# Patient Record
Sex: MALE | Race: WHITE | NOT HISPANIC OR LATINO | Employment: FULL TIME | URBAN - METROPOLITAN AREA
[De-identification: names, ages, dates, MRNs, and addresses within clinical notes are randomized per-mention and may not be internally consistent; named-entity substitution may affect disease eponyms.]

---

## 2022-10-11 ENCOUNTER — TELEPHONE (OUTPATIENT)
Dept: PSYCHIATRY | Facility: CLINIC | Age: 37
End: 2022-10-11

## 2022-10-11 DIAGNOSIS — F90.0 ATTENTION DEFICIT HYPERACTIVITY DISORDER (ADHD), PREDOMINANTLY INATTENTIVE TYPE: Primary | ICD-10-CM

## 2022-10-11 NOTE — TELEPHONE ENCOUNTER
Client calling as he will run out of methylphenidate 10mg  Per PDMP only 14 day supply was filled appt scheduled 10/28  Forwarding to Dr Arsenio Montez

## 2022-10-12 RX ORDER — METHYLPHENIDATE HYDROCHLORIDE 10 MG/1
TABLET ORAL
Qty: 60 TABLET | Refills: 0 | Status: SHIPPED | OUTPATIENT
Start: 2022-10-12 | End: 2022-10-28 | Stop reason: SDUPTHER

## 2022-10-12 NOTE — TELEPHONE ENCOUNTER
This note was not shared with the patient due to reasonable likelihood of causing patient harm     Regarding Pt Emmett Mancuso)  : 1985 - chart reviewed on Methlyphenidate ER 10mg; Methlyphenidate 10mg BID for ADHD  PDMP , received short supply of the latter  Sent refill to pharmacy on file  Ayden Kapadia was called and informed

## 2022-10-27 NOTE — PATIENT INSTRUCTIONS
Kolton Tena [id number]   Thanks for presenting to today's Appointment at [facility]  Lamotrigine was increased to 150mg    Otherwise Your medications were not changed

## 2022-10-27 NOTE — PSYCH
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   He also teaches biology  Reported for teachers October and March is a more stressful  Mood: depressed and a little angela  ROS:  DEPRESSION: yes, although " Fanny by not trying to not internalize things  Stated he has resilience   Sleeping 6- 9hrs , Denies changes in energy level "    ANGELA: yes, " Stated for  "the past couple of day [he's] been having high mood  " But not angela"  Rajinder Jorge L  Described h/o angela - as  " gambling, wanting sex , spending money  Happened his  freshamn year of college  Sometimes  lasted days and hrs,            maybe a wk  Also had  grandiose thoughts, described as " thoughts of best decision he could ever make  Mood somewhat excitement  Then was             hospitalized x 1-2  days, was   diagnoses with Bipolar II disorder  Medication compliance: Yes  - Rajinder Coats reported noticed benefits from Paxil and Lamotrigine  Regarding Methylphenidate, he take 1 tab ER in the morning with 1 tab IR,                                                  by noon another 1 tab IR     Medication s/e- slightly " loopy" - thinks is due to responsibility at his job      SI/HI- no      Chart reviewed  Psychiatric History  Inpatient Hospitalization x 1 ( while in college)  [facility] , more recent [facility] 2019 ( PTSD, [place])   Prior med trials: Depakote , Lithium          Mental Status Evaluation:  General Appearance:  Rajinder Coats Olga Keller is a 44 y o   male casually dressed, adequate hygiene and grooming, looks stated age   Behavior:  pleasant, cooperative, intermittent eye contact, restless, High energy    Speech:  Normal to slightly pressured;  WNLrhythm, volume, latency, amount   Mood:  depressed and a little angela   Affect:  increased in intensity and mood-incongruent, excited , confidence    Thought Process:  logical and tangential   Thought Content:  normal   Perceptual Disturbances: no auditory hallucinations, no visual hallucinations, Does not appear responding or preoccupied   Delusions  No   Risk Potential: Suicidal Ideations No  Homicidal Ideations No  Potential for Aggression No     Sensorium:  Oriented to person, place, time/date and situation   Memory:  recent and remote memory grossly intact   Consciousness:  alert and awake   Attention: attention span and concentration are age appropriate   Insight:  fair   Judgment: fair   Gait/Station: normal   Motor Activity: no abnormal movements     There were no vitals filed for this visit  Medications:   Current Outpatient Medications on File Prior to Visit   Medication Sig Dispense Refill   •  lamoTRIgine (LaMICtal) 100 mg tablet  1 tablet by mouth per day     •  methylphenidate (METADATE ER) 10 MG ER tablet  1 tablet by mouth per morning     •  PARoxetine (PAXIL) 20 mg tablet  1 tablet by mouth per morning     •  methylphenidate (Ritalin) 10 mg tablet  1 tablet by mouth twice per day           Labs: I have personally reviewed all pertinent laboratory/tests results   Most Recent Labs: No results found for: WBC, RBC, HGB, HCT, PLT, RDW, NEUTROABS, SODIUM, K, CL, CO2, BUN, CREATININE, GLUC, GLUF, CALCIUM, AST, ALT, ALKPHOS, TP, ALB, TBILI, CHOLESTEROL, HDL, TRIG, LDLCALC, NONHDLC, VALPROICTOT, CARBAMAZEPIN, LITHIUM, AMMONIA, [id number], FREET4, T3FREE, PREGSERUM, HCG, HCGQUANT, RPR, HGBA1C, EAG     Vitals:    10/28/22 1444   BP: 133/98   Cuff Size: Adult   Pulse: 58   Weight: 84 4 kg (186 lb)   Height: 6' (1 829 m)     ________________________________________________________________________    A/P  Milly Simmons, 39 yr old  male, h/o hospitalization for depression and anxiety, h/o ADHD  Presented w/ complaints of inattention  History gathered: ASRS, medical records from prior provider  Prior trial of Ritalin ( 2016) , d/c Ativan  Currently tolerating Methylphenidate ER + IR ; increased productivity  Today reported history of bipolar disorder s/p hospitalization for angela, meeting criteria for symptoms unsure duration; history of MDD  Case complicated by MSE finding suggestive of acute angela - elated mood , high energy , confidence  Otherwise complaints of tolerance of Mehtlylphenidate, Lillkey due to low dose , otherwise that and antidepressant may have precipitated angela  DSM5  Otherwise specified  bipolar disorder ([address] )  Attention deficit hyperactivity disorder (ADHD), unspecified ADHD type  Unspecified Anxiety Disorder- resolved  Rule out acute angela      PLANS:  Discussed clinical finding and diagnoses of bipolar symptoms  Discussed possible antipsychotic or increased of Lamotrigine   Increased Lamotrigine to 150mg  Continue  Other medications as prescribed  Paxil 20mg  Methylphenidate ER 10mg  Methylphenidate IR 10 BID  PDMP w/o discrepancies-   Vitals are stable         Risks, benefits, and possible side effects of medications explained to patient and patient verbalizes understanding  Next Appointment:  2 months      [facility]  Face To Face:  2:36 PM -3:11PM;Documentation Time:  6:56 PM- 7:42 PM    Encounter Duration: Time Spent 35 minutes with Patient  Greater than 50% of total time was spent with the patient

## 2022-10-28 ENCOUNTER — OFFICE VISIT (OUTPATIENT)
Dept: PSYCHIATRY | Facility: CLINIC | Age: 37
End: 2022-10-28

## 2022-10-28 VITALS
HEART RATE: 58 BPM | WEIGHT: 186 LBS | BODY MASS INDEX: 25.19 KG/M2 | SYSTOLIC BLOOD PRESSURE: 133 MMHG | HEIGHT: 72 IN | DIASTOLIC BLOOD PRESSURE: 98 MMHG

## 2022-10-28 DIAGNOSIS — F90.0 ATTENTION DEFICIT HYPERACTIVITY DISORDER (ADHD), PREDOMINANTLY INATTENTIVE TYPE: ICD-10-CM

## 2022-10-28 DIAGNOSIS — F31.89 OTHER BIPOLAR DISORDER (HCC): Primary | ICD-10-CM

## 2022-10-28 DIAGNOSIS — F90.9 ATTENTION DEFICIT HYPERACTIVITY DISORDER (ADHD), UNSPECIFIED ADHD TYPE: ICD-10-CM

## 2022-10-28 RX ORDER — METHYLPHENIDATE HYDROCHLORIDE EXTENDED RELEASE 10 MG/1
TABLET ORAL
Qty: 30 TABLET | Refills: 0 | Status: SHIPPED | OUTPATIENT
Start: 2022-10-28

## 2022-10-28 RX ORDER — METHYLPHENIDATE HYDROCHLORIDE EXTENDED RELEASE 10 MG/1
10 TABLET ORAL EVERY MORNING
COMMUNITY
Start: 2022-09-27 | End: 2022-10-28 | Stop reason: SDUPTHER

## 2022-10-28 RX ORDER — METHYLPHENIDATE HYDROCHLORIDE 10 MG/1
TABLET ORAL
Qty: 60 TABLET | Refills: 0 | Status: SHIPPED | OUTPATIENT
Start: 2022-10-28

## 2022-10-28 RX ORDER — PAROXETINE HYDROCHLORIDE 20 MG/1
20 TABLET, FILM COATED ORAL EVERY EVENING
COMMUNITY
Start: 2022-10-18 | End: 2022-10-28 | Stop reason: SDUPTHER

## 2022-10-28 RX ORDER — LAMOTRIGINE 150 MG/1
TABLET ORAL
Qty: 30 TABLET | Refills: 1 | Status: SHIPPED | OUTPATIENT
Start: 2022-10-28

## 2022-10-28 RX ORDER — PAROXETINE HYDROCHLORIDE 20 MG/1
TABLET, FILM COATED ORAL
Qty: 30 TABLET | Refills: 1 | Status: SHIPPED | OUTPATIENT
Start: 2022-10-28

## 2022-10-28 RX ORDER — LAMOTRIGINE 100 MG/1
100 TABLET ORAL DAILY
COMMUNITY
Start: 2022-10-18 | End: 2022-10-28 | Stop reason: SDUPTHER

## 2022-11-25 ENCOUNTER — TELEPHONE (OUTPATIENT)
Dept: PSYCHIATRY | Facility: CLINIC | Age: 37
End: 2022-11-25

## 2022-11-28 DIAGNOSIS — F90.9 ATTENTION DEFICIT HYPERACTIVITY DISORDER (ADHD), UNSPECIFIED ADHD TYPE: ICD-10-CM

## 2022-11-28 RX ORDER — METHYLPHENIDATE HYDROCHLORIDE EXTENDED RELEASE 10 MG/1
TABLET ORAL
Qty: 30 TABLET | Refills: 0 | Status: SHIPPED | OUTPATIENT
Start: 2022-11-28

## 2022-11-28 NOTE — TELEPHONE ENCOUNTER
Medication Refill Request     Name of Medication Methylphenidate ER  Dose/Frequency 10mg qam  Quantity 30  Verified pharmacy   [x]  Verified ordering Provider   [x]  Does patient have enough for the next 3 days? Yes [] No [x]  Does patient have a follow-up appointment scheduled?  Yes [x] No []   If so when is appointment: 12/28/2022

## 2022-11-28 NOTE — TELEPHONE ENCOUNTER
Hunter CREWS 1985  Chart was reviewed    Metadate ER 10mg was sent pharmacy on file     This note was not shared with the patient due to reasonable likelihood of causing patient harm

## 2022-12-13 DIAGNOSIS — F90.0 ATTENTION DEFICIT HYPERACTIVITY DISORDER (ADHD), PREDOMINANTLY INATTENTIVE TYPE: ICD-10-CM

## 2022-12-13 RX ORDER — METHYLPHENIDATE HYDROCHLORIDE 10 MG/1
TABLET ORAL
Qty: 60 TABLET | Refills: 0 | Status: SHIPPED | OUTPATIENT
Start: 2022-12-13

## 2022-12-13 NOTE — TELEPHONE ENCOUNTER
Pt Nuzhat CREWS 1985  Chart was reviewed , PDMP without discrepancies     Methylphenidate 10mg BID was sent to [facility]           Current Outpatient Medications Prescribed   •  methylphenidate (Ritalin) 10 mg tablet, Methylphenidate  10m tablet po twice daily, Disp: 60 tablet, Rfl: 0    This note was not shared with the patient due to reasonable likelihood of causing patient harm

## 2022-12-13 NOTE — TELEPHONE ENCOUNTER
Medication Refill Request     Name of Medication methylphenidate  Dose/Frequency 10mg tablets bid  Quantity 60  Verified pharmacy   [x]  Verified ordering Provider   [x]  Does patient have enough for the next 3 days? Yes [] No [x]  Does patient have a follow-up appointment scheduled?  Yes [x] No []   If so when is appointment: 12/28/2022    Last fill per PDMP: 11/10/2022
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
## 2022-12-25 NOTE — PATIENT INSTRUCTIONS
José Art [id number]   Thanks for presenting to today's Appointment at [address] [place] [address] Methylphenidate extended release was increased to 20mg in the morning + 15IR in afternoon    Diet is also important to monitor - avoiding foods that lower effectiveness ( such as vit C , cranberry, etc)

## 2022-12-28 ENCOUNTER — OFFICE VISIT (OUTPATIENT)
Dept: PSYCHIATRY | Facility: CLINIC | Age: 37
End: 2022-12-28

## 2022-12-28 VITALS
BODY MASS INDEX: 25.2 KG/M2 | SYSTOLIC BLOOD PRESSURE: 138 MMHG | DIASTOLIC BLOOD PRESSURE: 87 MMHG | HEIGHT: 71 IN | HEART RATE: 64 BPM | WEIGHT: 180 LBS

## 2022-12-28 DIAGNOSIS — F31.89 OTHER BIPOLAR DISORDER (HCC): ICD-10-CM

## 2022-12-28 DIAGNOSIS — F90.9 ATTENTION DEFICIT HYPERACTIVITY DISORDER (ADHD), UNSPECIFIED ADHD TYPE: ICD-10-CM

## 2022-12-28 RX ORDER — METHYLPHENIDATE HYDROCHLORIDE 5 MG/1
TABLET ORAL
Qty: 90 TABLET | Refills: 0 | Status: SHIPPED | OUTPATIENT
Start: 2022-12-28

## 2022-12-28 RX ORDER — LAMOTRIGINE 150 MG/1
TABLET ORAL
Qty: 30 TABLET | Refills: 1 | Status: SHIPPED | OUTPATIENT
Start: 2022-12-28

## 2022-12-28 RX ORDER — METHYLPHENIDATE HYDROCHLORIDE EXTENDED RELEASE 20 MG/1
TABLET ORAL
Qty: 30 TABLET | Refills: 0 | Status: SHIPPED | OUTPATIENT
Start: 2022-12-28

## 2023-01-23 DIAGNOSIS — F90.9 ATTENTION DEFICIT HYPERACTIVITY DISORDER (ADHD), UNSPECIFIED ADHD TYPE: ICD-10-CM

## 2023-01-23 DIAGNOSIS — F31.89 OTHER BIPOLAR DISORDER (HCC): ICD-10-CM

## 2023-01-23 DIAGNOSIS — F90.0 ATTENTION DEFICIT HYPERACTIVITY DISORDER (ADHD), PREDOMINANTLY INATTENTIVE TYPE: ICD-10-CM

## 2023-01-23 RX ORDER — PAROXETINE HYDROCHLORIDE 20 MG/1
TABLET, FILM COATED ORAL
Qty: 30 TABLET | Refills: 2 | Status: SHIPPED | OUTPATIENT
Start: 2023-01-23

## 2023-01-23 RX ORDER — METHYLPHENIDATE HYDROCHLORIDE 5 MG/1
TABLET ORAL
Qty: 90 TABLET | Refills: 0 | Status: SHIPPED | OUTPATIENT
Start: 2023-01-23

## 2023-01-23 NOTE — TELEPHONE ENCOUNTER
Hunter Davis 1985 chart at [facility] was reviewed  H/o ADHD , Bipolar disorder  PDMP w/o discrepancies    Sent Ritalin and Paxil to [facility]          Current Medications Prescribed During This Encounter:   •  methylphenidate (Ritalin) 5 mg tablet, Methylphenidate 5m-3 tablets po in the afternoon (1-2pm), Disp: 90 tablet, Rfl: 0  •  PARoxetine (PAXIL) 20 mg tablet, Paroxetine 20m tablet daily, Disp: 30 tablet, Rfl: 2        Medications Discontinued During This Encounter   Medication Reason   • methylphenidate (Ritalin) 10 mg tablet         This note was not shared with the patient due to reasonable likelihood of causing patient harm

## 2023-01-23 NOTE — TELEPHONE ENCOUNTER
Medication Refill Request     Name of Medication Ritalin   Dose/Frequency 5 mg 2-3 daily   Quantity 30   Verified pharmacy   [x]  Verified ordering Provider   [x]  Does patient have enough for the next 3 days? Yes [x] No []  Does patient have a follow-up appointment scheduled? Yes [x] No []   If so when is appointment: 02/8/23    Medication Refill Request     Name of Medication Paxil   Dose/Frequency 20 mg   Quantity 90  Verified pharmacy   [x]  Verified ordering Provider   [x]  Does patient have enough for the next 3 days? Yes [x] No []  Does patient have a follow-up appointment scheduled?  Yes [x] No []   If so when is appointment: 02/8/23

## 2023-02-06 ENCOUNTER — TELEPHONE (OUTPATIENT)
Dept: PSYCHIATRY | Facility: CLINIC | Age: 38
End: 2023-02-06

## 2023-02-06 DIAGNOSIS — F90.9 ATTENTION DEFICIT HYPERACTIVITY DISORDER (ADHD), UNSPECIFIED ADHD TYPE: ICD-10-CM

## 2023-02-06 RX ORDER — METHYLPHENIDATE HYDROCHLORIDE EXTENDED RELEASE 20 MG/1
TABLET ORAL
Qty: 30 TABLET | Refills: 0 | Status: SHIPPED | OUTPATIENT
Start: 2023-02-06

## 2023-02-06 NOTE — PSYCH
[facility]/Hospital:  [address]   [facility]    Psychiatric Progress Note  MRN#: [id number]  Kemi Melgar 44 y o  male    This note was not shared with the patient due to reasonable likelihood of causing patient harm   This Patient was seen in the office today at [place] [address] location  __________________________________________________________________________________________________________________________________  OFFICE APPOINTMENT   Patient was seen today at [place] [address] location                                                Patient Kemi Melgar ,1985   Prescriber/Physician: Joelle Jennings DO Physician Location:   [address]   • Patient  is currently located in the [place], where I am  licensed  ___________________________________________________________________________________________________________________________________     Subjective:  Nimisha Toro reported griveing the death of his mother Jayesh Cotton)  After recent appointment he reported going down to [facility] and discovery she as not living great, inadequate living quarters, that he had to clean  Ruminate on mom's double life- addict  Reported on sadness, was tearful , although he equated that to grieft    Sleep , appetite - now stable x 3wk, 1 wk was intermittent   Also inability to shift focus, now he's returning to typical routine, reading more, retention information  Has supports regarding his wifeCarringcarol Wasserman), friends, also co-workers  Otherwise, he's compliant to Methylphenidate ER 20mg + IR 5mg x 3 daily  Prefers ER > IM due to less rapid stop of the drug  Reported increased focus and more calm      Mental Status Evaluation:  General Appearance:  Kemi Melgar is a 44 y o    male casually dressed, adequate hygiene and grooming, looks stated age   Behavior:  pleasant, cooperative, intermittent eye contact, restlessness,    Speech:  Normal to slightly pressured; WNL rhythm, volume, latency, amount   Mood:  depressed   Affect:  depression and tearful   Thought Process:  circumstantial and logical   Thought Content:  no overt delusions, normal, ruminations slightly    Perceptual Disturbances: no auditory hallucinations, no visual hallucinations, Does not appear responding or preoccupied   Delusions  No   Risk Potential: Suicidal Ideations No  Homicidal Ideations No  Potential for Aggression No     Sensorium:  Oriented to person, place, time/date and situation   Memory:  recent and remote memory grossly intact   Consciousness:  alert and awake   Attention: attention span and concentration are age appropriate   Insight:  fair   Judgment: fair   Gait/Station: normal   Motor Activity: no abnormal movements     There were no vitals filed for this visit  Medications:   Current Outpatient Medications on File Prior to Visit   Medication Sig Dispense Refill   •  lamoTRIgine (LaMICtal) 100 mg tablet  1 tablet by mouth per day     •  methylphenidate (METADATE ER) 10 MG ER tablet  1 tablet by mouth per morning     •  PARoxetine (PAXIL) 20 mg tablet  1 tablet by mouth per morning     •  methylphenidate (Ritalin) 10 mg tablet  1 tablet by mouth twice per day           Labs: I have personally reviewed all pertinent laboratory/tests results   Most Recent Labs: No results found for: WBC, RBC, HGB, HCT, PLT, RDW, NEUTROABS, SODIUM, K, CL, CO2, BUN, CREATININE, GLUC, GLUF, CALCIUM, AST, ALT, ALKPHOS, TP, ALB, TBILI, CHOLESTEROL, HDL, TRIG, LDLCALC, NONHDLC, VALPROICTOT, CARBAMAZEPIN, LITHIUM, AMMONIA, [id number], FREET4, T3FREE, PREGSERUM, HCG, HCGQUANT, RPR, HGBA1C, EAG     There were no vitals filed for this visit   ________________________________________________________________________    A/P  Bridgette Maloney, 44 y o ,  male, h/o hospitalization for depression and anxiety, h/o ADHD, presented regarding inattention w/ history and clinical finding of symptoms  Case complicated, h/o bipolar symptoms, recent hypomania finding  Currently MSE normative sadness cause via death of mother  Otherwise,  Murtis Morning has adequate response to higher dose of Methylphenidate ER and IR PRN  DSM 5  Attention deficit hyperactivity disorder (ADHD), unspecified ADHD type  Bipolar disorder , otherwise specified        PLANS:  History/external records were reviewed  Discussed diagnotic impression/clinical findings regarding grieve  Discussed stage of grief  PDMP w/o discrepancies- 23, 23  Vitals stable   Methylphenidate ER 20mg  Methylphenidate IR 5 mg x 3  Lamotrigine 150mg  Paxil 20mg    Medications Prescribed During This Encounter at [facility]:  -     lamoTRIgine (LaMICtal) 150 MG tablet; Lamotrigine 150m tablet po  Daily      Treatement: Risks, benefits, and possible side effects of medications explained to patient and patient verbalizes understanding  Next Appointment at [facility]: 5-8wks    Today's Appointment@ [facility]  Face To Face:  5:13 PM - 5:40PM    Documentation:   10:02PM to 10:07PM                                  Encounter Duration: Time Spent 27 minutes with Patient  Greater than 50% of total time was spent with the patient         MDM  Number of Diagnoses or Management Options  Attention deficit hyperactivity disorder (ADHD), unspecified ADHD type: established, improving  Otherwise specified bipolar disorder ([facility] [address] ): established, improving     Amount and/or Complexity of Data Reviewed  Review and summarize past medical records: yes    Risk of Complications, Morbidity, and/or Mortality  Presenting problems: moderate  Management options: moderate

## 2023-02-06 NOTE — PATIENT INSTRUCTIONS
Roby Baldwin [id number]   Thanks for presenting to today's Appointment at [facility]    Your medications were not changed

## 2023-02-06 NOTE — TELEPHONE ENCOUNTER
Patient requests enough to take atleast take him to his appt on Wednesday  Medication Refill Request     Name of Medication Metadate ER  Dose/Frequency 20mg 1 tab po per morning   Quantity 30 tabs  Verified pharmacy   [x]  Verified ordering Provider   [x]  Does patient have enough for the next 3 days? Yes [] No [x]  Does patient have a follow-up appointment scheduled?  Yes [x] No []   If so when is appointment:2/8/23 @5:00

## 2023-02-06 NOTE — TELEPHONE ENCOUNTER
Pt José Art 1985 chart as [facility] was reviewed ,h/o ADHD   PDMP w/o discrepancies  Methylphenidate ER 20mg was sent to [facility]        This note was not shared with the patient due to reasonable likelihood of causing patient harm

## 2023-02-08 ENCOUNTER — OFFICE VISIT (OUTPATIENT)
Dept: PSYCHIATRY | Facility: CLINIC | Age: 38
End: 2023-02-08

## 2023-02-08 VITALS
HEART RATE: 64 BPM | SYSTOLIC BLOOD PRESSURE: 143 MMHG | BODY MASS INDEX: 25.2 KG/M2 | DIASTOLIC BLOOD PRESSURE: 91 MMHG | HEIGHT: 71 IN | WEIGHT: 180 LBS

## 2023-02-08 DIAGNOSIS — F90.9 ATTENTION DEFICIT HYPERACTIVITY DISORDER (ADHD), UNSPECIFIED ADHD TYPE: Primary | ICD-10-CM

## 2023-02-08 DIAGNOSIS — F31.89 OTHER BIPOLAR DISORDER (HCC): ICD-10-CM

## 2023-02-08 RX ORDER — LAMOTRIGINE 150 MG/1
TABLET ORAL
Qty: 30 TABLET | Refills: 2 | Status: SHIPPED | OUTPATIENT
Start: 2023-02-08

## 2023-02-21 DIAGNOSIS — F31.89 OTHER BIPOLAR DISORDER (HCC): ICD-10-CM

## 2023-02-21 DIAGNOSIS — F90.9 ATTENTION DEFICIT HYPERACTIVITY DISORDER (ADHD), UNSPECIFIED ADHD TYPE: ICD-10-CM

## 2023-02-21 RX ORDER — PAROXETINE HYDROCHLORIDE 20 MG/1
TABLET, FILM COATED ORAL
Qty: 30 TABLET | Refills: 2 | Status: SHIPPED | OUTPATIENT
Start: 2023-02-21

## 2023-02-21 RX ORDER — METHYLPHENIDATE HYDROCHLORIDE 5 MG/1
TABLET ORAL
Qty: 90 TABLET | Refills: 0 | Status: SHIPPED | OUTPATIENT
Start: 2023-02-21

## 2023-02-21 NOTE — TELEPHONE ENCOUNTER
Patient calling for RF of paxil and ritalin IR to hold over until appointment on 3/22/23  Last appointment 2//8/23 but additional script not provided at that time  Last fill per PDMP: 1/25/2023  Forwarding to Dr Aleshia Beckwith for approval as Dr Solomon Stephenson is unavailable

## 2023-03-06 DIAGNOSIS — F31.89 OTHER BIPOLAR DISORDER (HCC): ICD-10-CM

## 2023-03-06 DIAGNOSIS — F90.9 ATTENTION DEFICIT HYPERACTIVITY DISORDER (ADHD), UNSPECIFIED ADHD TYPE: ICD-10-CM

## 2023-03-06 NOTE — TELEPHONE ENCOUNTER
Srinivasan Nava  Needs RF for methylphenidate ER 20mg, Last filled per PDMP: 2/6/2023  Also needs Rf of lamotrigine   Forwarding to Dr Vanita Dobson for approval

## 2023-03-07 RX ORDER — METHYLPHENIDATE HYDROCHLORIDE EXTENDED RELEASE 20 MG/1
TABLET ORAL
Qty: 30 TABLET | Refills: 0 | Status: SHIPPED | OUTPATIENT
Start: 2023-03-07

## 2023-03-07 RX ORDER — LAMOTRIGINE 150 MG/1
TABLET ORAL
Qty: 30 TABLET | Refills: 0 | Status: SHIPPED | OUTPATIENT
Start: 2023-03-07

## 2023-03-07 NOTE — TELEPHONE ENCOUNTER
Client called, states the pharmacy has received approval on medication refills, however, they are awaiting a call from the office  Client is unsure why they are awaiting a call when we have sent approval  Please contact pharmacy for more information

## 2023-03-07 NOTE — TELEPHONE ENCOUNTER
Received a voicemail from client this morning informing us that today is the last day of supply for Metadate ER 20mg  Please fill at soonest convenience

## 2023-03-21 DIAGNOSIS — F90.9 ATTENTION DEFICIT HYPERACTIVITY DISORDER (ADHD), UNSPECIFIED ADHD TYPE: ICD-10-CM

## 2023-03-21 DIAGNOSIS — F31.89 OTHER BIPOLAR DISORDER (HCC): ICD-10-CM

## 2023-03-21 RX ORDER — METHYLPHENIDATE HYDROCHLORIDE 5 MG/1
TABLET ORAL
Qty: 90 TABLET | Refills: 0 | Status: CANCELLED | OUTPATIENT
Start: 2023-03-21

## 2023-03-21 RX ORDER — PAROXETINE HYDROCHLORIDE 20 MG/1
TABLET, FILM COATED ORAL
Qty: 30 TABLET | Refills: 2 | Status: CANCELLED | OUTPATIENT
Start: 2023-03-21

## 2023-03-21 NOTE — TELEPHONE ENCOUNTER
Pt called - he lvm on rx line yesterday,3/21/23, and was calling for status  Looked in Pt chart- looks like refills haven't been requested yet  Medication Refill Request     Name of Medication Paxil  Dose/Frequency 20 mg/ 1 tab daily  Quantity 30tabs  Verified pharmacy   [x]  Verified ordering Provider   [x]  Does patient have enough for the next 3 days? Yes [] No [x]  Does patient have a follow-up appointment scheduled? Yes [] No [x]   If so when is appointment: Prescriber on Emergency Family Leave  Pt is on Maintenance meds  Name of Medication Ritalin  Dose/Frequency 5 mg/ 2-3 tabs po in the afternoon (1-2pm)  Quantity 90tabs  Verified pharmacy   [x]  Verified ordering Provider   [x]  Does patient have enough for the next 3 days? Yes [] No [x]  Does patient have a follow-up appointment scheduled? Yes [] No [x]   If so when is appointment: Prescriber on Emergency Family Leave  Pt is on Maintenance meds

## 2023-03-22 DIAGNOSIS — F90.9 ATTENTION DEFICIT HYPERACTIVITY DISORDER (ADHD), UNSPECIFIED ADHD TYPE: ICD-10-CM

## 2023-03-22 DIAGNOSIS — F31.89 OTHER BIPOLAR DISORDER (HCC): ICD-10-CM

## 2023-03-22 RX ORDER — PAROXETINE HYDROCHLORIDE 20 MG/1
TABLET, FILM COATED ORAL
Qty: 30 TABLET | Refills: 1 | Status: SHIPPED | OUTPATIENT
Start: 2023-03-22

## 2023-03-22 RX ORDER — METHYLPHENIDATE HYDROCHLORIDE EXTENDED RELEASE 20 MG/1
TABLET ORAL
Qty: 30 TABLET | Refills: 0 | Status: SHIPPED | OUTPATIENT
Start: 2023-04-04

## 2023-03-22 RX ORDER — METHYLPHENIDATE HYDROCHLORIDE 5 MG/1
TABLET ORAL
Qty: 90 TABLET | Refills: 0 | Status: SHIPPED | OUTPATIENT
Start: 2023-03-22

## 2023-03-22 RX ORDER — LAMOTRIGINE 150 MG/1
TABLET ORAL
Qty: 30 TABLET | Refills: 1 | Status: SHIPPED | OUTPATIENT
Start: 2023-03-22

## 2023-03-22 NOTE — TELEPHONE ENCOUNTER
Patient calling for RF  [facility] Plan currently unavailable  Will forward RF request to available provider  Last fill of ritalin 5mg-2/22  Last fill of metadate ER - 3/9

## 2023-05-02 DIAGNOSIS — F90.9 ATTENTION DEFICIT HYPERACTIVITY DISORDER (ADHD), UNSPECIFIED ADHD TYPE: ICD-10-CM

## 2023-05-02 NOTE — TELEPHONE ENCOUNTER
Patient of Dr Amanda Conde, who is currently unavailable, calling for RF of methylphenidate ER 20mg  Last fill per PDMP: 4/6   Forwarding for approval

## 2023-05-03 RX ORDER — METHYLPHENIDATE HYDROCHLORIDE EXTENDED RELEASE 20 MG/1
TABLET ORAL
Qty: 30 TABLET | Refills: 0 | Status: SHIPPED | OUTPATIENT
Start: 2023-05-03

## 2023-05-16 DIAGNOSIS — F90.9 ATTENTION DEFICIT HYPERACTIVITY DISORDER (ADHD), UNSPECIFIED ADHD TYPE: ICD-10-CM

## 2023-05-17 RX ORDER — METHYLPHENIDATE HYDROCHLORIDE 5 MG/1
TABLET ORAL
Qty: 90 TABLET | Refills: 0 | Status: SHIPPED | OUTPATIENT
Start: 2023-05-17

## 2023-05-30 DIAGNOSIS — F31.89 OTHER BIPOLAR DISORDER (HCC): ICD-10-CM

## 2023-05-30 DIAGNOSIS — F90.9 ATTENTION DEFICIT HYPERACTIVITY DISORDER (ADHD), UNSPECIFIED ADHD TYPE: ICD-10-CM

## 2023-05-30 NOTE — TELEPHONE ENCOUNTER
Patient of Dr Sabine Riggs, who is currently unavailable, calling for RF of metadate 20mg and lamotrigine  Last fill per PDMP: 5/4   Forwarding for approval

## 2023-05-31 RX ORDER — LAMOTRIGINE 150 MG/1
TABLET ORAL
Qty: 30 TABLET | Refills: 1 | Status: SHIPPED | OUTPATIENT
Start: 2023-05-31

## 2023-05-31 RX ORDER — METHYLPHENIDATE HYDROCHLORIDE EXTENDED RELEASE 20 MG/1
TABLET ORAL
Qty: 30 TABLET | Refills: 0 | Status: SHIPPED | OUTPATIENT
Start: 2023-06-01

## 2023-05-31 NOTE — TELEPHONE ENCOUNTER
Called patient and confirmed that he has been compliant with meds, including Lamotrigine (Lamictal) and he has enough to last till Friday  Discussed risks of being non compliant with Lamotrigine (Lamictal) including Kathya Ekaterina Syndrome

## 2023-06-06 ENCOUNTER — TELEPHONE (OUTPATIENT)
Dept: PSYCHIATRY | Facility: CLINIC | Age: 38
End: 2023-06-06

## 2023-06-06 NOTE — TELEPHONE ENCOUNTER
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

## 2023-06-12 DIAGNOSIS — F90.9 ATTENTION DEFICIT HYPERACTIVITY DISORDER (ADHD), UNSPECIFIED ADHD TYPE: ICD-10-CM

## 2023-06-12 DIAGNOSIS — F31.89 OTHER BIPOLAR DISORDER (HCC): ICD-10-CM

## 2023-06-12 RX ORDER — PAROXETINE HYDROCHLORIDE 20 MG/1
TABLET, FILM COATED ORAL
Qty: 30 TABLET | Refills: 1 | Status: SHIPPED | OUTPATIENT
Start: 2023-06-12

## 2023-06-12 RX ORDER — METHYLPHENIDATE HYDROCHLORIDE 5 MG/1
TABLET ORAL
Qty: 90 TABLET | Refills: 0 | Status: SHIPPED | OUTPATIENT
Start: 2023-06-12

## 2023-06-12 NOTE — TELEPHONE ENCOUNTER
Patient of Dr Dilshad Coley, who is currently unavailable in need of RF for paxil and methylphenidate 5mg  Last fill per pDMP: 5/17

## 2023-06-28 DIAGNOSIS — F90.9 ATTENTION DEFICIT HYPERACTIVITY DISORDER (ADHD), UNSPECIFIED ADHD TYPE: ICD-10-CM

## 2023-06-28 DIAGNOSIS — F31.89 OTHER BIPOLAR DISORDER (HCC): ICD-10-CM

## 2023-06-28 RX ORDER — LAMOTRIGINE 150 MG/1
TABLET ORAL
Qty: 30 TABLET | Refills: 0 | Status: SHIPPED | OUTPATIENT
Start: 2023-06-28

## 2023-06-28 RX ORDER — METHYLPHENIDATE HYDROCHLORIDE EXTENDED RELEASE 20 MG/1
TABLET ORAL
Qty: 30 TABLET | Refills: 0 | Status: SHIPPED | OUTPATIENT
Start: 2023-06-28

## 2023-07-12 ENCOUNTER — OFFICE VISIT (OUTPATIENT)
Dept: PSYCHIATRY | Facility: CLINIC | Age: 38
End: 2023-07-12
Payer: COMMERCIAL

## 2023-07-12 DIAGNOSIS — F90.9 ATTENTION DEFICIT HYPERACTIVITY DISORDER (ADHD), UNSPECIFIED ADHD TYPE: ICD-10-CM

## 2023-07-12 DIAGNOSIS — F31.89 OTHER BIPOLAR DISORDER (HCC): ICD-10-CM

## 2023-07-12 PROCEDURE — 99213 OFFICE O/P EST LOW 20 MIN: CPT

## 2023-07-12 RX ORDER — PAROXETINE 30 MG/1
TABLET, FILM COATED ORAL
Qty: 30 TABLET | Refills: 1 | Status: SHIPPED | OUTPATIENT
Start: 2023-07-12

## 2023-07-12 RX ORDER — METHYLPHENIDATE HYDROCHLORIDE 5 MG/1
TABLET ORAL
Qty: 90 TABLET | Refills: 0 | Status: SHIPPED | OUTPATIENT
Start: 2023-07-12

## 2023-07-12 NOTE — PSYCH
[facility]/Hospital: [address] [facility], [address]    Psychiatric Progress Note  MRN#: [id number]  Bill Rivera 44 y.o. male    This note was not shared with the patient due to reasonable likelihood of causing patient harm   This Patient was seen in the office today at [address] [facility] location. __________________________________________________________________________________________________________________________________  OFFICE APPOINTMENT   Patient was seen today at [address] [facility] location                                                Patient Bill Rivera ,1985   Prescriber/Physician: BISI Perez Physician Location:   [address] [facility] [address]   • Patient  is currently located in the [place], where I am  licensed  ___________________________________________________________________________________________________________________________________     Subjective:  Patient of Dr. Villanueva, being treated for ADHD and bipolar 2 disorder. Patient is observed on Lamictal 150mg po daily, Paxil 20mg po daily, Methylphenidate ER 20mg po daily and Methylphenidate IR 5mg po TID. Patient has been compliant with his medications and denies any side effects. Patient states "things have been okay," since last appointment but has experience an increase in depressive symptoms. Patient continues to have grief about mother who passed away and was tearful at times. Patient reports sleep has improved but struggles to get up in the morning and takes frequent naps throughout the day. Appetite fluctuates with an increase in stress eating. Energy and motivation has been low, patient reports drinking more coffee and hitting the snooze button more often.  Patient reports he recently saw PCP and labs were WNL other than her thyroid levels, which he will follow up on. Patient reports he fractured his elbow in April 2023 and has gotten permission to swim, which he is happy about. Patient has a good support system including his wife, friends, coworkers and sees his counselor once a week. Patient denies elevated moods and SI/HI. Mental Status Evaluation:  General Appearance:  Dennis Partida is a 44 y.o.  male casually dressed, adequate hygiene and grooming, looks stated age   Behavior:  pleasant, cooperative, intermittent eye contact   Speech: WNL rhythm, volume, latency, amount   Mood:  depressed   Affect:  depression and tearful at times   Thought Process:  circumstantial and logical   Thought Content:  no overt delusions, normal, ruminations slightly    Perceptual Disturbances: no auditory hallucinations, no visual hallucinations, Does not appear responding or preoccupied   Delusions  No   Risk Potential: Suicidal Ideations No  Homicidal Ideations No  Potential for Aggression No     Sensorium:  Oriented to person, place, time/date and situation   Memory:  recent and remote memory grossly intact   Consciousness:  alert and awake   Attention: attention span and concentration are age appropriate   Insight:  fair   Judgment: fair   Gait/Station: normal   Motor Activity: no abnormal movements     There were no vitals filed for this visit. Medications:   Current Outpatient Medications on File Prior to Visit   Medication Sig Dispense Refill   •  lamoTRIgine (LaMICtal) 100 mg tablet  1 tablet by mouth per day     •  methylphenidate (METADATE ER) 10 MG ER tablet  1 tablet by mouth per morning     •  PARoxetine (PAXIL) 20 mg tablet  1 tablet by mouth per morning     •  methylphenidate (Ritalin) 10 mg tablet  1 tablet by mouth twice per day           Labs: I have personally reviewed all pertinent laboratory/tests results.  Most Recent Labs: No results found for: "WBC", "RBC", "HGB", "HCT", "PLT", "RDW", "NEUTROABS", "SODIUM", "K", "CL", "CO2", "BUN", "CREATININE", "GLUC", "GLUF", "CALCIUM", "AST", "ALT", "ALKPHOS", "TP", "ALB", "TBILI", "CHOLESTEROL", "HDL", "TRIG", "LDLCALC", "[address]", "VALPROICTOT", "CARBAMAZEPIN", "LITHIUM", "AMMONIA", "[id number]", "Lindle Mew", "Marvia Likes", "PREGSERUM", "HCG", "HCGQUANT", "RPR", "HGBA1C", "EAG"     There were no vitals filed for this visit.  ________________________________________________________________________    A/P  Arin Gannonshant, 44 y.o.,  male, h/o hospitalization for depression and anxiety, h/o ADHD, presented regarding inattention w/ history and clinical finding of symptoms. Case complicated, h/o bipolar symptoms, recent hypomania finding. Currently MSE normative sadness cause via death of mother. Otherwise,  Baldemar Pepe has adequate response to higher dose of Methylphenidate ER and IR PRN. DSM 5  Attention deficit hyperactivity disorder (ADHD), unspecified ADHD type  Bipolar disorder , otherwise specified        PLANS:  PDMP w/o discrepancies- 06/14/23, 6/29/23  Continue Methylphenidate ER 20mg po QAM  Continue Methylphenidate IR 5mg po TID  Continue Lamotrigine 150mg po daily  Increase Paxil to 30mg po daily       Treatement: Risks, benefits, and possible side effects of medications explained to patient and patient verbalizes understanding. [facility] 1 month f/u with Dr. Keenan Edmond Appointment@ [facility]  Face To Face:  3:20 PM - 3:50PM                                  Encounter Duration: Time Spent 20 minutes with Patient. Greater than 50% of total time was spent with the patient

## 2023-07-25 DIAGNOSIS — F31.89 OTHER BIPOLAR DISORDER (HCC): ICD-10-CM

## 2023-07-25 DIAGNOSIS — F90.9 ATTENTION DEFICIT HYPERACTIVITY DISORDER (ADHD), UNSPECIFIED ADHD TYPE: ICD-10-CM

## 2023-07-25 RX ORDER — METHYLPHENIDATE HYDROCHLORIDE EXTENDED RELEASE 20 MG/1
TABLET ORAL
Qty: 30 TABLET | Refills: 0 | Status: SHIPPED | OUTPATIENT
Start: 2023-07-25

## 2023-07-25 RX ORDER — LAMOTRIGINE 150 MG/1
TABLET ORAL
Qty: 21 TABLET | Refills: 1 | Status: SHIPPED | OUTPATIENT
Start: 2023-07-25

## 2023-07-25 NOTE — TELEPHONE ENCOUNTER
Pt  Dennis Partida 1985 , SLP[facility] chart was reviewed. PDMP w/o discrepancies.      Lamotrigine, Methylphenidate was sent to [facility]        This note was not shared with the patient due to reasonable likelihood of causing patient harm

## 2023-08-09 DIAGNOSIS — F90.9 ATTENTION DEFICIT HYPERACTIVITY DISORDER (ADHD), UNSPECIFIED ADHD TYPE: ICD-10-CM

## 2023-08-09 RX ORDER — METHYLPHENIDATE HYDROCHLORIDE 5 MG/1
TABLET ORAL
Qty: 90 TABLET | Refills: 0 | Status: SHIPPED | OUTPATIENT
Start: 2023-08-09

## 2023-08-09 NOTE — TELEPHONE ENCOUNTER
Pt. La Nena Jacobo, 1985 , SLPF chart was reviewed.  PDMP w/o discrepancies    Ritalin 5mg , 90qty was sent to [facility] [facility]      This note was not shared with the patient due to reasonable likelihood of causing patient harm

## 2023-08-10 NOTE — PSYCH
[facility]/Hospital: [address] [facility], [address]    Psychiatric Progress Note  MRN#: [id number]  Shawn Enrique 44 y.o. male    This note was not shared with the patient due to reasonable likelihood of causing patient harm   _________________________________________________________________________________________________________________________________  OFFICE APPOINTMENT   Seen today at [address] [facility] location                                                Patient Shawn Enrique ,1985   Prescriber/Physician: Gabi Lewis DO Physician Location:   [address] [facility] [address]     • This service was provided in the office. Patient is currently located in the [place], where I am  licensed. • Patient gave consent to proceed with encounter; acknowledge understanding of security and privacy of encounter   • Patient identity was verified [facility]  • Patient verbalized understanding evaluation only involves Psychiatric diagnosing, prescribing, result monitoring   • Patient was informed this is a billable service  ___________________________________________________________________________________________________________________________________       Subjective:  Mayank stated mood as depressed; as there's more thought about his mom ( in Dec 22). He's noticed increase guilt about cutting communication with her due to her drinking, Ruminated about prior interventions that were not successful. He described fears of his dad not going through something similar. Mayank acknowledged crying more, difficulties getting up in the morning. 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  Otherwise, he loves his job, "just want to get out of this "funk." Complained also about panic attacks, this its due to Ritalin, h/o similar s/e when previously prescribed. Eleanor Alvarez not sure if he want's to remain on stimulants. He requested Bupropion trail. He recently started Paxil 30mg , thinks that's working although not w/Ritalin. Regarding herlinda- he denied recent symptoms- described once and awhile, once monthly low sleep, talkative ,not intense. He noticed that's worse after drinking      ROS:  Depression: defer to HPI; amotivation  Anxiety: defer to HPI  Appetite- he stress eats ( fast foods)  Irritability: denies  Sleep: "lag time falling to sleep, in general "Okay"  Hopelessness- denies   Herlinda- defer to HPI   SI/HI  : denies     Medication: Eleanor Alvarez is compliant to Ritalin, Methylphenidate, Paxil  Medication side effects: Paxil -he gained 4lb      Medical ROS:  Pertinent items are noted in HPI, all other symptoms are negative   Substance Hx:  Alcohol- 3 night a wk in the summer     FHX:  Alcoholism- mother, dad  Sister- Bipolar I disorder    Social Hx  Eleanor Alvarez works as a teacher , 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 Mental Status Evaluation:  General Appearance:  Eleanor Alvarez is a 44 y.o.  male casually dressed, adequate hygiene and grooming, looks stated age   Behavior:  pleasant, cooperative, intermittent eye contact,    Speech:  Normal to slightly pressured;   WNL rhythm, volume, latency, amount   Mood:  depressed, anxious   Affect:  depression and tearful   Thought Process:  circumstantial and logical   Thought Content:  no overt delusions, normal, ruminations slightly    Perceptual Disturbances:  Does not appear responding or preoccupied   Delusions  No   Risk Potential: Suicidal Ideations No  Homicidal Ideations No  Potential for Aggression No     Sensorium:  Oriented to person, place, time/date and situation   Memory:  recent and remote memory grossly intact   Consciousness:  alert and awake   Attention: attention span and concentration are age appropriate   Insight:  fair   Judgment: fair   Gait/Station: normal   Motor Activity: no abnormal movements     There were no vitals filed for this visit. Medications:   Current Outpatient Medications on File Prior to Visit   Medication Sig Dispense Refill   •  lamoTRIgine (LaMICtal) 100 mg tablet  1 tablet by mouth per day     •  methylphenidate (METADATE ER) 10 MG ER tablet  1 tablet by mouth per morning     •  PARoxetine (PAXIL) 20 mg tablet  1 tablet by mouth per morning     •  methylphenidate (Ritalin) 10 mg tablet  1 tablet by mouth twice per day           Labs: I have personally reviewed all pertinent laboratory/tests results. Most Recent Labs: No results found for: "WBC", "RBC", "HGB", "HCT", "PLT", "RDW", "NEUTROABS", "SODIUM", "K", "CL", "CO2", "BUN", "CREATININE", "GLUC", "GLUF", "CALCIUM", "AST", "ALT", "ALKPHOS", "TP", "ALB", "TBILI", "CHOLESTEROL", "HDL", "TRIG", "LDLCALC", "[address] [facility]", "VALPROICTOT", "CARBAMAZEPIN", "LITHIUM", "AMMONIA", "[id number]", "[employer]", "Deepthi Lax", "PREGSERUM", "HCG", "HCGQUANT", "RPR", "HGBA1C", "EAG"     There were no vitals filed for this visit.  ________________________________________________________________________    A/P  Vannessa Durbin, 44 y.o.,  male, h/o hospitalization for depression and anxiety, h/o ADHD, presented regarding inattention w/ history and clinical finding of symptoms, interim events- bipolar symptoms, hypomania. He now presents with depressive symptoms and anxiety; slight improvement since Paxil was increased to 30mg. DSM 5  Depressed bipolar II disorder   Anxiety, unspecified   Attention deficit hyperactivity disorder (ADHD), unspecified ADHD type      PLANS:  History/external records were reviewed. Discussed diagnotic impression/clinical findings regarding grieve. Discussed stage of grief. Did not change patients medication- he was informed Paxil requiring more time for effectiveness.   Paxil 30mg  Lamotrigine 150mg  Methylphenidate ER 20mg  Methylphenidate IR 5 mg x 3        Medications Prescribed During This Encounter at [facility]:  -     lamoTRIgine (LaMICtal) 150 MG tablet; Lamotrigine 150m tablet po  Daily, qty 30, rf 1      Treatement: Risks, benefits, and possible side effects of medications explained to patient and patient verbalizes understanding. Next Appointment at [facility]: 3wks    Today's Appointment@ [facility]  Face To Face:  10:07AM-10:39AM   Documentation:  12:32PM-12:52PM                             Encounter Duration: Time Spent 32 minutes with Patient. Greater than 50% of total time was spent with the patient         MDM  Number of Diagnoses or Management Options  Anxiety: new, no workup  Attention deficit hyperactivity disorder (ADHD), unspecified ADHD type: established, improving  Depressed bipolar II disorder ([address]): new, no workup     Amount and/or Complexity of Data Reviewed  Review and summarize past medical records: yes    Risk of Complications, Morbidity, and/or Mortality  Presenting problems: moderate  Diagnostic procedures: moderate  Management options: moderate

## 2023-08-10 NOTE — PATIENT INSTRUCTIONS
Chay Corbin [id number]   Thanks for presenting to today's Appointment at [facility] and Your medications were not changed

## 2023-08-14 ENCOUNTER — OFFICE VISIT (OUTPATIENT)
Dept: PSYCHIATRY | Facility: CLINIC | Age: 38
End: 2023-08-14
Payer: COMMERCIAL

## 2023-08-14 DIAGNOSIS — F31.89 OTHER BIPOLAR DISORDER (HCC): ICD-10-CM

## 2023-08-14 DIAGNOSIS — F90.9 ATTENTION DEFICIT HYPERACTIVITY DISORDER (ADHD), UNSPECIFIED ADHD TYPE: ICD-10-CM

## 2023-08-14 DIAGNOSIS — F31.81 DEPRESSED BIPOLAR II DISORDER (HCC): Primary | ICD-10-CM

## 2023-08-14 DIAGNOSIS — F41.9 ANXIETY: ICD-10-CM

## 2023-08-14 PROCEDURE — 99214 OFFICE O/P EST MOD 30 MIN: CPT | Performed by: PSYCHIATRY & NEUROLOGY

## 2023-08-14 RX ORDER — LAMOTRIGINE 150 MG/1
TABLET ORAL
Qty: 30 TABLET | Refills: 1 | Status: SHIPPED | OUTPATIENT
Start: 2023-08-14

## 2023-08-30 ENCOUNTER — TELEPHONE (OUTPATIENT)
Dept: PSYCHIATRY | Facility: CLINIC | Age: 38
End: 2023-08-30

## 2023-08-30 DIAGNOSIS — F90.9 ATTENTION DEFICIT HYPERACTIVITY DISORDER (ADHD), UNSPECIFIED ADHD TYPE: ICD-10-CM

## 2023-08-31 RX ORDER — METHYLPHENIDATE HYDROCHLORIDE EXTENDED RELEASE 20 MG/1
TABLET ORAL
Qty: 30 TABLET | Refills: 0 | Status: SHIPPED | OUTPATIENT
Start: 2023-08-31

## 2023-08-31 RX ORDER — METHYLPHENIDATE HYDROCHLORIDE 5 MG/1
TABLET ORAL
Qty: 90 TABLET | Refills: 0 | Status: SHIPPED | OUTPATIENT
Start: 2023-08-31

## 2023-08-31 NOTE — TELEPHONE ENCOUNTER
Client called to inform that Ritalin 5mg was sent to the pharmacy, however, he is requesting the ER 20mg. He is unsure if he specified this yesterday. Client is kindly requesting Rx be resent for Ritalin ER 20mg.

## 2023-08-31 NOTE — TELEPHONE ENCOUNTER
Pt Jeimy Toure, 1985, SLPF chart was reviewed.  PDMP w/o discrepancies, Ritalin 5mg qty 90 was sent to [facility]       This note was not shared with the patient due to reasonable likelihood of causing patient harm

## 2023-08-31 NOTE — TELEPHONE ENCOUNTER
Pt Poncho Orozco, 1985, SLPF chart reviewed, PDMP w/o discrepancies.  Methylphenidate ER 20mg was sent to [facility]      This note was not shared with the patient due to reasonable likelihood of causing patient harm

## 2023-09-08 DIAGNOSIS — F31.89 OTHER BIPOLAR DISORDER (HCC): ICD-10-CM

## 2023-09-08 RX ORDER — PAROXETINE 30 MG/1
TABLET, FILM COATED ORAL
Qty: 30 TABLET | Refills: 2 | Status: SHIPPED | OUTPATIENT
Start: 2023-09-08

## 2023-09-08 NOTE — TELEPHONE ENCOUNTER
Pt Jeimy Toure, 1985, SLPF chart was reviewed.   Paroxetine 30mg was sent to [facility]    This note was not shared with the patient due to reasonable likelihood of causing patient harm

## 2023-09-22 NOTE — PSYCH
[facility]/Hospital: [address] [facility], [address]    Psychiatric Progress Note  MRN#: [id number]  Moses Eason 44 y.o. male    This note was not shared with the patient due to reasonable likelihood of causing patient harm   _________________________________________________________________________________________________________________________________  OFFICE APPOINTMENT   Seen today at [address] [facility] location                                                Patient Moses Eason ,1985   Prescriber/Physician: Janel Blakely DO Physician Location:   [facility] [address] [facility] [address]     • This service was provided in the office. Patient is currently located in the [place], where I am  licensed. • Patient gave consent to proceed with encounter; acknowledge understanding of security and privacy of encounter   • [facility]  • Patient verbalized understanding evaluation only involves Psychiatric diagnosing, prescribing, result monitoring   • Patient was informed this is a billable service  ___________________________________________________________________________________________________________________________________     Chief Complaint   Patient presents with   • Depression         Subjective: Mojgan Camargo reported recent depressive state, although he returned to work and thinks things slightly improved. Later described depression as not  laying down all the time. Also thinks its not cause he is not consistent with Ritalin. , currently not on Ritalin everywkend. Not sure if Paxil is effective . Otherwise stated he's work is fine and striving since leadership position ; productive w/ teaching. Delarosa  when Mojgan Camargo gets home, there guilt w/ mom's death.  He smokes THC to cope, mostly low amount, also lessen his drinking. Rocío Ortiz also stated everytimes he sees a old lady driving he thinks of times his mother was driving drunk and he'll say sorry mom      ROS:  Depression: defer to above  Anxiety:  denies  Appetite- denies, weigh lost since stop drinking alcohol  Irritability: denies  Sleep: duration- 6-8 hrs  Hopelessness-defer above  Herlinda- without recent high highs  SI/HI  - denies    Medication: Shay Urena is compliant to Paxil , Methylphenidate ER 20mg ,  Ritalin 5mg ( early afternoon)  Med s/e- denies      Medical ROS:   Denies fever, chills, chest pain, dizziness, SOB   Pertinent items are noted in HPI, all other symptoms are negative     Substance Hx:  Alcohol - he no longer keeps alcohol in the home            Mental Status Evaluation:  General Appearance:  Shay Urena is a 44 y.o.  male casually dressed, adequate hygiene and grooming, looks stated age   Behavior:  + agitation;  intermittent eye contact,    Speech:  Normal   WNL rhythm, volume, latency, amount   Mood:  depressed, anxious   Affect:  depression and tearful   Thought Process:  normal and logical   Thought Content:  no overt delusions, normal, ruminations slightly    Perceptual Disturbances:  Does not appear responding or preoccupied   Delusions  w/o   Risk Potential: Suicidal Ideations w/o  Homicidal Ideations w/o  Potential for Aggression No     Sensorium:  Oriented to person, place, time/date and situation [facility], September , 2023)   Memory:  recent and remote memory grossly intact   Consciousness:  alert and awake   Attention: attention span and concentration are age appropriate   Insight:  appropriate   Judgment: appropriate   Gait/Station: normal   Motor Activity: no abnormal movements     There were no vitals filed for this visit.      Medications:   Current Outpatient Medications on File Prior to Visit   Medication Sig Dispense Refill   •  lamoTRIgine (LaMICtal) 100 mg tablet  1 tablet by mouth per day     • methylphenidate (METADATE ER) 10 MG ER tablet  1 tablet by mouth per morning     •  PARoxetine (PAXIL) 20 mg tablet  1 tablet by mouth per morning     •  methylphenidate (Ritalin) 10 mg tablet  1 tablet by mouth twice per day           Labs: I have personally reviewed all pertinent laboratory/tests results. Most Recent Labs: No results found for: "WBC", "RBC", "HGB", "HCT", "PLT", "RDW", "NEUTROABS", "SODIUM", "K", "CL", "CO2", "BUN", "CREATININE", "GLUC", "GLUF", "CALCIUM", "AST", "ALT", "ALKPHOS", "TP", "ALB", "TBILI", "CHOLESTEROL", "HDL", "TRIG", "LDLCALC", "[address] [facility]", "VALPROICTOT", "CARBAMAZEPIN", "LITHIUM", "AMMONIA", "[id number]", "Fatou Flash", "[place]", "PREGSERUM", "HCG", "HCGQUANT", "RPR", "HGBA1C", "EAG"     There were no vitals filed for this visit.  ________________________________________________________________________    A/P  Kervin Stein, 44 y.o.,  male, h/o hospitalization for depression and anxiety, h/o ADHD, presented regarding inattention w/ history and clinical finding of symptoms. Interim events of  bipolar hypomania and later depression. Currently , reports of slight improvement since high dose Paxil. Not with complete symptoms of MDD. Devoid of Si ,plan or intent. Hence he was determined as not appropriate for hospitalization. DSM 5  Depressed bipolar II disorder   Anxiety, unspecified   Unspecified Attention deficit hyperactivity disorder (ADHD)  Rule out PTSD        PLANS:  History/external records were reviewed.  Discussedclinical findings/ diagnotic impression regarding depression  Lamotrigine : increased to 50mg+ 150mg  Did not change other meds  Paxil 30mg  Methylphenidate ER 20mg  Methylphenidate IR 5 mg x 3  PDMP reviewed , w/o discrepancies - 23, 23  Treatment plan was completed 2023; Kervin Stein was accepting of treatment plan      Medications Prescribed During This Encounter at [facility]:  -     lamoTRIgine (LaMICtal) 100 mg tablet; Lamotrigine 100m/2 tablet  + 150mg tablet po daily  -     lamoTRIgine (LaMICtal) 150 MG tablet; Lamotrigine 150m tablet po  Daily  -     methylphenidate (Ritalin) 5 mg tablet; Methylphenidate 5m-3 tablets po in the afternoon (1-2pm)  -     methylphenidate (METADATE ER) 20 mg ER tablet; Methylphenidate ER 20m tablet po in the  morning          Treatement: Risks, benefits, and possible side effects of medications explained to patient and patient verbalizes understanding. [facility]    Today's Appointment@ [facility]  Face To Face:  10:56AM- 11:43AM   Documentation:      12:55PM-   1:07PM            Encounter Duration: Time Spent 47 minutes with Patient. Greater than 50% of total time was spent with the patient         MDM  Number of Diagnoses or Management Options  Anxiety: established, improving  Attention deficit hyperactivity disorder (ADHD), unspecified ADHD type: established, improving  Depressed bipolar II disorder ([address]): established, worsening     Amount and/or Complexity of Data Reviewed  Review and summarize past medical records: yes    Risk of Complications, Morbidity, and/or Mortality  Presenting problems: low  Diagnostic procedures: moderate  Management options: moderate

## 2023-09-22 NOTE — PATIENT INSTRUCTIONS
Shay Urena [id number]   Thanks for presenting to today's Appointment at [facility]  Lamotrigine was increase to 50mg+ 150mg daily   Your other  medications were not changed

## 2023-09-25 ENCOUNTER — OFFICE VISIT (OUTPATIENT)
Dept: PSYCHIATRY | Facility: CLINIC | Age: 38
End: 2023-09-25
Payer: COMMERCIAL

## 2023-09-25 DIAGNOSIS — F31.81 DEPRESSED BIPOLAR II DISORDER (HCC): Primary | ICD-10-CM

## 2023-09-25 DIAGNOSIS — F90.9 ATTENTION DEFICIT HYPERACTIVITY DISORDER (ADHD), UNSPECIFIED ADHD TYPE: ICD-10-CM

## 2023-09-25 DIAGNOSIS — F41.9 ANXIETY: ICD-10-CM

## 2023-09-25 PROCEDURE — 99213 OFFICE O/P EST LOW 20 MIN: CPT | Performed by: PSYCHIATRY & NEUROLOGY

## 2023-09-25 RX ORDER — METHYLPHENIDATE HYDROCHLORIDE EXTENDED RELEASE 20 MG/1
TABLET ORAL
Qty: 30 TABLET | Refills: 0 | Status: SHIPPED | OUTPATIENT
Start: 2023-09-25

## 2023-09-25 RX ORDER — LAMOTRIGINE 150 MG/1
TABLET ORAL
Qty: 90 TABLET | Refills: 0 | Status: SHIPPED | OUTPATIENT
Start: 2023-09-25

## 2023-09-25 RX ORDER — LAMOTRIGINE 100 MG/1
TABLET ORAL
Qty: 30 TABLET | Refills: 1 | Status: SHIPPED | OUTPATIENT
Start: 2023-09-25

## 2023-09-25 RX ORDER — METHYLPHENIDATE HYDROCHLORIDE 5 MG/1
TABLET ORAL
Qty: 90 TABLET | Refills: 0 | Status: SHIPPED | OUTPATIENT
Start: 2023-09-25

## 2023-09-25 NOTE — BH TREATMENT PLAN
TREATMENT PLAN                                                                  [id number] [facility]          This note was not shared with the patient due to reasonable likelihood of causing patient harm     Name and Date of Birth:  Kervin Stein 44 y.o. 1985  Date of Treatment Plan: September 25, 2023  Diagnosis/Diagnoses:    1. Depressed bipolar II disorder ([address])    2. Anxiety    3. Attention deficit hyperactivity disorder (ADHD), unspecified ADHD type        Strengths/Personal Resources for Self-Care: He has familal supports, reliance ,  ability to reason, leadership skills and enjoy his work, ambitious general fund of knowledge,  Area/Areas of need (in own words): depressive symptoms, mood instability, attention and concentration problems  1)  Long Term Goal:   •        Lessen  Depression; stabilize mood and maintain stability of inattention  Reach Goal Date: 1 yr-2 yrs  Person/Persons responsible for completion of goal: Kervin Stein     2. Short Term Objective (s) - How to reach this goal?:   •         Recommended treatment: Adherence to antidepressants, antianxiety and mood stabilizer , ADHD stimulants due to diagnoses. •         Routine monitoring of symptom reduction  •         Routine monitoring of labs if necessary   •         CRISIS intervention/Acute Hospitalization if necessary   Reach Goal Date: 6 months- 1yr  Person/Persons Responsible for Completion of Goal: Kervin Bensoney    Progress Towards Goals: progressing slowly  Treatment Modality: routine appointment q 1-3 months at [facility]  Review due 180 days from date of this plan: 6 months - 3/25/2024  Expected length of service: 1-3yrs unless revised      This treatment plan was formulated via my Physician/ Psychiatrist and Keshia Fuentes , understand the goals and objectives listed .  I agree to work on goals developed for my treatment.

## 2023-10-23 DIAGNOSIS — F90.9 ATTENTION DEFICIT HYPERACTIVITY DISORDER (ADHD), UNSPECIFIED ADHD TYPE: ICD-10-CM

## 2023-10-23 RX ORDER — METHYLPHENIDATE HYDROCHLORIDE EXTENDED RELEASE 20 MG/1
TABLET ORAL
Qty: 30 TABLET | Refills: 0 | Status: SHIPPED | OUTPATIENT
Start: 2023-10-23

## 2023-10-23 NOTE — TELEPHONE ENCOUNTER
Pt Saúl Tucker, 1985, [facility] chart was reviewed.    PDMP w/o discrepancies rf= 09/28/2023    Methylphenidate ER 20mg , qty 30sent to [facility] in [place]

## 2023-11-01 DIAGNOSIS — F90.9 ATTENTION DEFICIT HYPERACTIVITY DISORDER (ADHD), UNSPECIFIED ADHD TYPE: ICD-10-CM

## 2023-11-01 RX ORDER — METHYLPHENIDATE HYDROCHLORIDE 5 MG/1
TABLET ORAL
Qty: 90 TABLET | Refills: 0 | Status: SHIPPED | OUTPATIENT
Start: 2023-11-01

## 2023-11-01 NOTE — TELEPHONE ENCOUNTER
Pt Epi Nelson, 1985 , [facility] chart was reviewed.  PDMP w/o discrepancies : refilled Ritalin 10/04/2023    Ritalin 5mg qty 90 was sent to [facility]        This note was not shared with the patient due to reasonable likelihood of causing patient harm

## 2023-11-21 DIAGNOSIS — F90.9 ATTENTION DEFICIT HYPERACTIVITY DISORDER (ADHD), UNSPECIFIED ADHD TYPE: ICD-10-CM

## 2023-11-21 RX ORDER — METHYLPHENIDATE HYDROCHLORIDE EXTENDED RELEASE 20 MG/1
TABLET ORAL
Qty: 30 TABLET | Refills: 0 | Status: SHIPPED | OUTPATIENT
Start: 2023-11-21

## 2023-11-21 NOTE — TELEPHONE ENCOUNTER
Pt Saúl Tucker, 1985 , [facility] chart was reviewed.  PDMP w/o discrepancies - RF 10/26/2023     Methylphenidate ER 20mg was sent to [facility]     This note was not shared with the patient due to reasonable likelihood of causing patient harm

## 2023-12-01 DIAGNOSIS — F90.9 ATTENTION DEFICIT HYPERACTIVITY DISORDER (ADHD), UNSPECIFIED ADHD TYPE: ICD-10-CM

## 2023-12-01 RX ORDER — METHYLPHENIDATE HYDROCHLORIDE 5 MG/1
TABLET ORAL
Qty: 90 TABLET | Refills: 0 | Status: SHIPPED | OUTPATIENT
Start: 2023-12-01

## 2023-12-01 NOTE — TELEPHONE ENCOUNTER
Pt Henrique Thornton, 1985, [facility] chart was reviewed, PDMP, refilled 11/01/23     Ritalin 5mg qty 90 was sent to [address] [facility]    This note was not shared with the patient due to reasonable likelihood of causing patient harm

## 2023-12-08 NOTE — PATIENT INSTRUCTIONS
Jarred Rizo [id number]   Thanks for presenting to today's Appointment at [facility]  Lamotrigine : Increase to 1 tablet + 150mg tablet   Your other  medications were not changed

## 2023-12-08 NOTE — PSYCH
[facility]: [facility], [address]    Psychiatric Progress Note  MRN#: [id number]  Lily Hassan 43 y.o. male    This note was not shared with the patient due to reasonable likelihood of causing patient harm   _________________________________________________________________________________________________________________________________  OFFICE APPOINTMENT   Seen today at [address] [facility] location                                                Patient Lily Hassan ,1985   Prescriber/Physician: Beto Hollis DO Physician Location:   [address] [facility] [id number]     This service was provided in the office. Patient is currently located in the [place], where I am  licensed. Patient gave consent to proceed with encounter; acknowledge understanding of security and privacy of encounter   [facility]  Patient verbalized understanding evaluation only involves Psychiatric diagnosing, prescribing, result monitoring   Patient was informed this is a billable service  ___________________________________________________________________________________________________________________________________     Chief Complaint   Patient presents with   • Depression         Subjective: Vicenta Marcial increaed Lamotrigine, not sure if medication is working. . Otherwise reported another lost in his family - mother's brother  of lung cancer. Also 1 yr annerviesry of mothers death . Stated he's not thriving but certain stable." Although prior to incident ,Lily Mo not sure if he 's more depresed, sometimes don't think Lamotrigine is working.  Now he's requesting another antidepressant       ROS:  SI/HI  -  denies ,   Depression: sad and he blames himself for cutting mother off in last couple of yrs  Anxiety:  denies, rare   Energy- he complained of tiredness   Sleep - duration 6-8 hrs and more on his wkends; 1 wk ago has problems sleeping   Appetite- stable   Irritability: Shay Urena anger - h/o temper tantraum towards his dad and sisters, when pondering of family   Herlinda- denies, although when uncle   had slight  low sleep, abnormal diet and was not tired  Tobacco -smoke 1/2 ppd , has cravings, history of peak symptoms when he was younger , wants to stop. Difficulties in doing so, has prior attempts at nicotine patch - s/e , now on nicotine gum      Medication: Shay Urena is compliant to Lamotrigine 200mg , Paxil  30mg, Methylphenidate ER 20mg ,  Ritalin 5mg ( early afternoon)  Med s/e- denies      Medical ROS:   Respiratory: negative except for SOB and lethergic if missing Lamotrigine  Cardiovascular: negative for chest pain and palpitations  Gastrointestinal: negative for abdominal pain, constipation, diarrhea, nausea, and vomiting   Pertinent items are noted in HPI, all other symptoms are negative     Medical Trial:   Lithium - tiredness , weight gain  Depakote- tiredness, weight gain  Prozac,- uncertain if working    Duloxetine ,   Zoloft;            Mental Status Evaluation:  General Appearance:  Shay Urena is a 43 y.o.   male casually dressed, dressed appropriately, looks stated age   Behavior:    Slight Restless, avoidant > intermittent eye contact,    Speech:  Normal   WNL rhythm, volume, latency, amount   Mood:  depressed   Affect:  depressedl   Thought Process:  normal and logical   Thought Content:  no overt delusions, normal, ruminations slightly    Perceptual Disturbances:  Does not appear responding or preoccupied   Delusions  w/o   Risk Potential: Suicidal Ideations w/o  Homicidal Ideations w/o  Potential for Aggression  w/o   Sensorium:  Oriented to person, place,  [facility]) time/date( Dec 11, 2023) and situation    Memory:  recent and remote memory grossly intact   Consciousness:  alert and awake   Attention: attention span and concentration are age appropriate   Insight:  appropriate   Judgment: appropriate   Gait/Station: normal   Motor Activity: no abnormal movements     There were no vitals filed for this visit. Medications:   Current Outpatient Medications on File Prior to Visit   Medication Sig Dispense Refill   •  lamoTRIgine (LaMICtal) 100 mg tablet  1 tablet by mouth per day     •  methylphenidate (METADATE ER) 10 MG ER tablet  1 tablet by mouth per morning     •  PARoxetine (PAXIL) 20 mg tablet  1 tablet by mouth per morning     •  methylphenidate (Ritalin) 10 mg tablet  1 tablet by mouth twice per day           Labs: I have personally reviewed all pertinent laboratory/tests results. Most Recent Labs: No results found for: "WBC", "RBC", "HGB", "HCT", "PLT", "RDW", "NEUTROABS", "SODIUM", "K", "CL", "CO2", "BUN", "CREATININE", "GLUC", "GLUF", "CALCIUM", "AST", "ALT", "ALKPHOS", "TP", "ALB", "TBILI", "CHOLESTEROL", "HDL", "TRIG", "LDLCALC", "[facility]", "VALPROICTOT", "CARBAMAZEPIN", "LITHIUM", "AMMONIA", "[id number]", "Fatou Flash", "[place]", "PREGSERUM", "HCG", "HCGQUANT", "RPR", "HGBA1C", "EAG"     There were no vitals filed for this visit.  ________________________________________________________________________    A/P  Kervin Abebekeley, 43 y.o.,  male, h/o hospitalization for depression and anxiety, h/o ADHD, presented regarding inattention w/ history and clinical finding of symptoms. Interim events of  bipolar hypomania and later depression. He has slight improvement since high dose Paxil, and without change since higher does of Lamotrigine, likely also cause of grief and coping process. Devoid of Si ,plan or intent. DSM 5  1. Depressed bipolar II disorder ([address])    2. Attention deficit hyperactivity disorder (ADHD), unspecified ADHD type    3. Tobacco use disorder, mild, abuse            PLANS:  History/external records were reviewed. Discussedclinical findings/ diagnotic impression regarding depression  Encouraged smoke cessation. Plans to discussed Bupropion on next appointment, pt Senthil Ely was accepting of plan  Lamotrigine : increased to 100mg+ 150mg  Did not change other meds  Paxil 30mg  Methylphenidate ER 20mg  Methylphenidate IR 5 mg x 3  PDMP reviewed , w/o discrepancies - 23, 23        Medications Prescribed During This Encounter at [facility]:  -     lamoTRIgine (LaMICtal) 100 mg tablet; Lamotrigine 100m  + 150mg tablet po daily      Medication List Otherwise:  -     lamoTRIgine (LaMICtal) 150 MG tablet; Lamotrigine 150m tablet po  Daily  -     methylphenidate (Ritalin) 5 mg tablet; Methylphenidate 5m-3 tablets po in the afternoon (1-2pm)  -     methylphenidate (METADATE ER) 20 mg ER tablet; Methylphenidate ER 20m tablet po in the  morning          Treatement: Risks, benefits, and possible side effects of medications explained to patient and patient verbalizes understanding. Next Appointment at [facility]:  6wks    Today's Appointment@ [facility]  Patient Encounter Time:   5:04PM-  5:44 Documentation Time: 23 8:25- 8:29  Time Spent  40 minutes with Patient. Greater than 50% of total time was spent with the patient         MDM  Number of Diagnoses or Management Options  Diagnosis management comments: 3       Amount and/or Complexity of Data Reviewed  Review and summarize past medical records: yes    Risk of Complications, Morbidity, and/or Mortality  Presenting problems: low  Diagnostic procedures: moderate  Management options: moderate                This note may have been written with the assistance of dictation software. Please excuse any grammatical  errors, misspellings,  and abnormal spacing of letters , sentences or paragraphs .  For accurate interpretation should read note from left to right

## 2023-12-11 ENCOUNTER — OFFICE VISIT (OUTPATIENT)
Dept: PSYCHIATRY | Facility: CLINIC | Age: 38
End: 2023-12-11
Payer: COMMERCIAL

## 2023-12-11 DIAGNOSIS — F90.9 ATTENTION DEFICIT HYPERACTIVITY DISORDER (ADHD), UNSPECIFIED ADHD TYPE: ICD-10-CM

## 2023-12-11 DIAGNOSIS — F17.200 TOBACCO USE DISORDER, MILD, ABUSE: ICD-10-CM

## 2023-12-11 DIAGNOSIS — F31.81 DEPRESSED BIPOLAR II DISORDER (HCC): Primary | ICD-10-CM

## 2023-12-11 PROCEDURE — 99213 OFFICE O/P EST LOW 20 MIN: CPT | Performed by: PSYCHIATRY & NEUROLOGY

## 2023-12-11 RX ORDER — LAMOTRIGINE 100 MG/1
TABLET ORAL
Qty: 30 TABLET | Refills: 1 | Status: SHIPPED | OUTPATIENT
Start: 2023-12-11

## 2023-12-19 DIAGNOSIS — F90.9 ATTENTION DEFICIT HYPERACTIVITY DISORDER (ADHD), UNSPECIFIED ADHD TYPE: ICD-10-CM

## 2023-12-19 RX ORDER — METHYLPHENIDATE HYDROCHLORIDE EXTENDED RELEASE 20 MG/1
TABLET ORAL
Qty: 30 TABLET | Refills: 0 | Status: SHIPPED | OUTPATIENT
Start: 2023-12-19

## 2023-12-19 NOTE — TELEPHONE ENCOUNTER
Pt Jermaine Land , 1985, [facility] chart was reviewed.   PDMP w/o discrepancies rf 11/24/23 .   Methylphenidate ER [facility]mg was [facility]     This note was not shared with the patient due to reasonable likelihood of causing patient harm

## 2023-12-19 NOTE — TELEPHONE ENCOUNTER
Patient calling for RF of metadate. Unable to access PDMP at this time. Appt scheduled in January.

## 2023-12-28 DIAGNOSIS — F90.9 ATTENTION DEFICIT HYPERACTIVITY DISORDER (ADHD), UNSPECIFIED ADHD TYPE: ICD-10-CM

## 2023-12-28 DIAGNOSIS — F31.89 OTHER BIPOLAR DISORDER (HCC): ICD-10-CM

## 2023-12-28 RX ORDER — METHYLPHENIDATE HYDROCHLORIDE 5 MG/1
TABLET ORAL
Qty: 90 TABLET | Refills: 0 | Status: SHIPPED | OUTPATIENT
Start: 2023-12-28

## 2023-12-28 RX ORDER — PAROXETINE 30 MG/1
TABLET, FILM COATED ORAL
Qty: 30 TABLET | Refills: 3 | Status: SHIPPED | OUTPATIENT
Start: 2023-12-28

## 2023-12-28 NOTE — TELEPHONE ENCOUNTER
Pt Jermaine Land , 1985 , [facility] chart was reviewed. PDMP reviewed 23.  Ritalin 5mg and Paxil 30mg was sent to [facility]        Medications Prescribed During [facility] Encounter:  -     methylphenidate (Ritalin) 5 mg tablet; Methylphenidate 5m-3 tablets po in the afternoon (1-2pm)  -     PARoxetine (PAXIL) 30 mg tablet; Paroxetine 30m tablet daily           This note was not shared with the patient due to reasonable likelihood of causing patient harm

## 2023-12-28 NOTE — TELEPHONE ENCOUNTER
Patient next appt scheduled 1/29. Requesting RF of methylphenidate 5mg and paxil. Last fill per pDMP: 12/1.

## 2024-01-18 DIAGNOSIS — F90.9 ATTENTION DEFICIT HYPERACTIVITY DISORDER (ADHD), UNSPECIFIED ADHD TYPE: ICD-10-CM

## 2024-01-18 DIAGNOSIS — F31.81 DEPRESSED BIPOLAR II DISORDER (HCC): ICD-10-CM

## 2024-01-18 RX ORDER — METHYLPHENIDATE HYDROCHLORIDE EXTENDED RELEASE 20 MG/1
TABLET ORAL
Qty: 30 TABLET | Refills: 0 | Status: SHIPPED | OUTPATIENT
Start: 2024-01-18

## 2024-01-18 RX ORDER — LAMOTRIGINE 150 MG/1
TABLET ORAL
Qty: 90 TABLET | Refills: 0 | Status: SHIPPED | OUTPATIENT
Start: 2024-01-18

## 2024-01-18 NOTE — TELEPHONE ENCOUNTER
Pt requested refills on the lamotrigine 150mg and the methlyphenidate 20mg be sent to the [facility] on file     PDMP last filled 12/29

## 2024-01-18 NOTE — TELEPHONE ENCOUNTER
Pt Jermaine Land , 1985 , [facility] chart was reviewed, PDMP 12/22/23     Methylphendiate ER 20mg and Lamotrigine 150mg was sent to [facility]     This note was not shared with the patient due to reasonable likelihood of causing patient harm

## 2024-01-22 ENCOUNTER — TELEPHONE (OUTPATIENT)
Dept: PSYCHIATRY | Facility: CLINIC | Age: 39
End: 2024-01-22

## 2024-01-22 NOTE — TELEPHONE ENCOUNTER
"Client requests to speak with Dr. Caraballo regarding one of his medications; states \"it's kind of an emergency\". Writer attempted to obtain a little more information, but client specified it is private. Please contact client at soonest convenience.   "

## 2024-01-22 NOTE — TELEPHONE ENCOUNTER
Pt Jermaine Land , 1985, [facility] chart was reviewed; called number on file at [phone], left him a message to call [facility] [phone] to discuss details      This note was not shared with the patient due to reasonable likelihood of causing patient harm

## 2024-01-22 NOTE — TELEPHONE ENCOUNTER
Regarding pt Jermaine Land, 1985 , Cc'mandie Santo , please call him to gather futher detail    This note was not shared with the patient due to reasonable likelihood of causing patient harm

## 2024-01-22 NOTE — TELEPHONE ENCOUNTER
Pt Jermaine Land , 1985, [facility] chart was reviewed. Returned call , he reported recent relapse on Drugs and alcohol and stated that includes Ritalin. He 's now in treatment. Jermaine Land requested Ritalin is stopped. He has a [facility] appointment next Monday, plans to discuss further then.      This note was not shared with the patient due to reasonable likelihood of causing patient harm

## 2024-04-08 ENCOUNTER — TELEPHONE (OUTPATIENT)
Dept: PSYCHIATRY | Facility: CLINIC | Age: 39
End: 2024-04-08

## 2024-04-08 NOTE — TELEPHONE ENCOUNTER
Returned client's voice mail and left message regarding discharging from care at [facility].      Client states his PCP ([facility]) will be prescribing and monitoring medication.

## 2024-09-17 ENCOUNTER — DOCUMENTATION (OUTPATIENT)
Dept: PSYCHIATRY | Facility: CLINIC | Age: 39
End: 2024-09-17

## 2024-09-17 NOTE — PSYCH
Psychiatric Discharge Summary- Administrative Discharge     Admit Date: See H&P for admit date  Discharge Date: 24     Discharge Diagnosis:   Patient Active Problem List   Diagnosis    Depressed bipolar II disorder (HCC)    Tobacco use disorder, mild, abuse    Attention deficit hyperactivity disorder (ADHD)        Treating Physician: Keshawn Caraballo DO      Presenting Problems/Pertinent Findings: See Initial H&P     Course of Treatment:See Most Recent Med Management Progress Note    The patient's primary treating provider is no longer with practice.  Patient has either not responded to outreach, has not been seen in over a year, or has indicated they plan to transfer care to a new provider.  The chart is being administratively closed at this time.  For treatment course, please request past records when patient was in treatment with primary provider..      Discharge Medications:   Current Outpatient Medications:     lamoTRIgine (LaMICtal) 100 mg tablet, Lamotrigine 100m  + 150mg tablet po daily, Disp: 30 tablet, Rfl: 1    lamoTRIgine (LaMICtal) 150 MG tablet, Lamotrigine 150m tablet po  Daily, Disp: 90 tablet, Rfl: 0    methylphenidate (METADATE ER) 20 mg ER tablet, Methylphenidate ER 20m tablet po in the  morning, Disp: 30 tablet, Rfl: 0    methylphenidate (Ritalin) 5 mg tablet, Methylphenidate 5m-3 tablets po in the afternoon (1-2pm), Disp: 90 tablet, Rfl: 0    PARoxetine (PAXIL) 30 mg tablet, Paroxetine 30m tablet daily, Disp: 30 tablet, Rfl: 3

## 2024-09-30 NOTE — PROGRESS NOTES
Psychiatric Discharge Summary- Administrative Discharge     Admit Date: See H&P for admit date  Discharge Date: 24     Discharge Diagnosis:   Patient Active Problem List   Diagnosis    Depressed bipolar II disorder (HCC)    Tobacco use disorder, mild, abuse    Attention deficit hyperactivity disorder (ADHD)        Treating Physician: Dr. Rashmi DO      Presenting Problems/Pertinent Findings: See Initial H&P     Course of Treatment:See Most Recent Med Management Progress Note    The patient's primary treating provider is no longer with practice.  Patient has either not responded to outreach, has not been seen in over a year, or has indicated they plan to transfer care to a new provider.  The chart is being administratively closed at this time.  For treatment course, please request past records when patient was in treatment with primary provider..      Discharge Medications:   Current Outpatient Medications:     lamoTRIgine (LaMICtal) 100 mg tablet, Lamotrigine 100m  + 150mg tablet po daily, Disp: 30 tablet, Rfl: 1    lamoTRIgine (LaMICtal) 150 MG tablet, Lamotrigine 150m tablet po  Daily, Disp: 90 tablet, Rfl: 0    methylphenidate (METADATE ER) 20 mg ER tablet, Methylphenidate ER 20m tablet po in the  morning, Disp: 30 tablet, Rfl: 0    methylphenidate (Ritalin) 5 mg tablet, Methylphenidate 5m-3 tablets po in the afternoon (1-2pm), Disp: 90 tablet, Rfl: 0    PARoxetine (PAXIL) 30 mg tablet, Paroxetine 30m tablet daily, Disp: 30 tablet, Rfl: 3